# Patient Record
Sex: FEMALE | Race: OTHER | Employment: FULL TIME | URBAN - METROPOLITAN AREA
[De-identification: names, ages, dates, MRNs, and addresses within clinical notes are randomized per-mention and may not be internally consistent; named-entity substitution may affect disease eponyms.]

---

## 2018-03-06 PROBLEM — Z80.49 FAMILY HISTORY OF UTERINE CANCER: Status: ACTIVE | Noted: 2018-03-06

## 2019-06-10 PROBLEM — Z00.00 ROUTINE GENERAL MEDICAL EXAMINATION AT A HEALTH CARE FACILITY: Status: ACTIVE | Noted: 2019-06-10

## 2019-06-10 PROBLEM — Z79.899 ENCOUNTER FOR LONG-TERM (CURRENT) USE OF OTHER MEDICATIONS: Status: ACTIVE | Noted: 2019-06-10

## 2019-09-20 PROBLEM — Z00.00 ROUTINE GENERAL MEDICAL EXAMINATION AT A HEALTH CARE FACILITY: Status: RESOLVED | Noted: 2019-06-10 | Resolved: 2019-09-20

## 2020-07-20 ENCOUNTER — HOSPITAL ENCOUNTER (OUTPATIENT)
Dept: LAB | Age: 55
Discharge: HOME | End: 2020-07-20

## 2020-07-20 PROCEDURE — 88305 TISSUE EXAM BY PATHOLOGIST: CPT

## 2022-03-18 PROBLEM — Z79.899 ENCOUNTER FOR LONG-TERM (CURRENT) USE OF OTHER MEDICATIONS: Status: ACTIVE | Noted: 2019-06-10

## 2022-03-19 PROBLEM — Z80.49 FAMILY HISTORY OF UTERINE CANCER: Status: ACTIVE | Noted: 2018-03-06

## 2022-04-04 LAB — MAMMOGRAPHY, EXTERNAL: NORMAL

## 2022-08-11 ENCOUNTER — NURSE ONLY (OUTPATIENT)
Dept: FAMILY MEDICINE CLINIC | Facility: CLINIC | Age: 57
End: 2022-08-11
Payer: COMMERCIAL

## 2022-08-11 DIAGNOSIS — E55.9 VITAMIN D DEFICIENCY: ICD-10-CM

## 2022-08-11 DIAGNOSIS — Z00.00 BLOOD TESTS FOR ROUTINE GENERAL PHYSICAL EXAMINATION: Primary | ICD-10-CM

## 2022-08-11 DIAGNOSIS — E78.00 PURE HYPERCHOLESTEROLEMIA: ICD-10-CM

## 2022-08-11 LAB
25(OH)D3 SERPL-MCNC: 35.7 NG/ML (ref 30–100)
ALBUMIN SERPL-MCNC: 4.3 G/DL (ref 3.5–5)
ALBUMIN/GLOB SERPL: 1.1 {RATIO} (ref 1.2–3.5)
ALP SERPL-CCNC: 61 U/L (ref 50–136)
ALT SERPL-CCNC: 18 U/L (ref 12–65)
ANION GAP SERPL CALC-SCNC: 5 MMOL/L (ref 7–16)
AST SERPL-CCNC: 21 U/L (ref 15–37)
BACTERIA URINE, POC: ABNORMAL
BASOPHILS # BLD: 0 K/UL (ref 0–0.2)
BASOPHILS NFR BLD: 1 % (ref 0–2)
BILIRUB SERPL-MCNC: 0.5 MG/DL (ref 0.2–1.1)
BILIRUBIN, URINE, POC: NEGATIVE
BLOOD URINE, POC: NEGATIVE
BUN SERPL-MCNC: 12 MG/DL (ref 6–23)
CALCIUM SERPL-MCNC: 9.8 MG/DL (ref 8.3–10.4)
CASTS URINE, POC: ABNORMAL
CHLORIDE SERPL-SCNC: 105 MMOL/L (ref 98–107)
CHOLEST SERPL-MCNC: 203 MG/DL
CO2 SERPL-SCNC: 27 MMOL/L (ref 21–32)
CREAT SERPL-MCNC: 0.7 MG/DL (ref 0.6–1)
DIFFERENTIAL METHOD BLD: ABNORMAL
EOSINOPHIL # BLD: 0.2 K/UL (ref 0–0.8)
EOSINOPHIL NFR BLD: 3 % (ref 0.5–7.8)
EPI CELLS URINE, POC: ABNORMAL
ERYTHROCYTE [DISTWIDTH] IN BLOOD BY AUTOMATED COUNT: 12.5 % (ref 11.9–14.6)
GLOBULIN SER CALC-MCNC: 3.9 G/DL (ref 2.3–3.5)
GLUCOSE SERPL-MCNC: 94 MG/DL (ref 65–100)
GLUCOSE URINE, POC: NEGATIVE
HCT VFR BLD AUTO: 36.1 % (ref 35.8–46.3)
HDLC SERPL-MCNC: 57 MG/DL (ref 40–60)
HDLC SERPL: 3.6 {RATIO}
HGB BLD-MCNC: 11.2 G/DL (ref 11.7–15.4)
HIV 1+2 AB+HIV1 P24 AG SERPL QL IA: NONREACTIVE
HIV 1/2 RESULT COMMENT: NORMAL
IMM GRANULOCYTES # BLD AUTO: 0 K/UL (ref 0–0.5)
IMM GRANULOCYTES NFR BLD AUTO: 0 % (ref 0–5)
KETONES, URINE, POC: NEGATIVE
LDLC SERPL CALC-MCNC: 98.6 MG/DL
LEUKOCYTE ESTERASE, URINE, POC: ABNORMAL
LYMPHOCYTES # BLD: 3.5 K/UL (ref 0.5–4.6)
LYMPHOCYTES NFR BLD: 45 % (ref 13–44)
MCH RBC QN AUTO: 31.2 PG (ref 26.1–32.9)
MCHC RBC AUTO-ENTMCNC: 31 G/DL (ref 31.4–35)
MCV RBC AUTO: 100.6 FL (ref 79.6–97.8)
MONOCYTES # BLD: 0.5 K/UL (ref 0.1–1.3)
MONOCYTES NFR BLD: 7 % (ref 4–12)
NEUTS SEG # BLD: 3.3 K/UL (ref 1.7–8.2)
NEUTS SEG NFR BLD: 44 % (ref 43–78)
NITRITE, URINE, POC: NEGATIVE
NRBC # BLD: 0 K/UL (ref 0–0.2)
PH, URINE, POC: 7.5 (ref 4.6–8)
PLATELET # BLD AUTO: 300 K/UL (ref 150–450)
PMV BLD AUTO: 10.5 FL (ref 9.4–12.3)
POTASSIUM SERPL-SCNC: 4.1 MMOL/L (ref 3.5–5.1)
PROT SERPL-MCNC: 8.2 G/DL (ref 6.3–8.2)
PROTEIN,URINE, POC: NEGATIVE
RBC # BLD AUTO: 3.59 M/UL (ref 4.05–5.2)
RBC, URINE, POC: ABNORMAL
SODIUM SERPL-SCNC: 137 MMOL/L (ref 136–145)
SPECIFIC GRAVITY, URINE, POC: 1.02 (ref 1–1.03)
TRICHOMONAS URINE, POC: ABNORMAL
TRIGL SERPL-MCNC: 237 MG/DL (ref 35–150)
TSH, 3RD GENERATION: 0.9 UIU/ML (ref 0.36–3.74)
URINALYSIS CLARITY, POC: ABNORMAL
URINALYSIS COLOR, POC: YELLOW
UROBILINOGEN, POC: NORMAL
VLDLC SERPL CALC-MCNC: 47.4 MG/DL (ref 6–23)
WBC # BLD AUTO: 7.5 K/UL (ref 4.3–11.1)
WBC, URINE, POC: ABNORMAL
YEAST, URINE, POC: ABNORMAL

## 2022-08-11 PROCEDURE — 81000 URINALYSIS NONAUTO W/SCOPE: CPT | Performed by: FAMILY MEDICINE

## 2022-08-23 ENCOUNTER — OFFICE VISIT (OUTPATIENT)
Dept: FAMILY MEDICINE CLINIC | Facility: CLINIC | Age: 57
End: 2022-08-23
Payer: COMMERCIAL

## 2022-08-23 VITALS
BODY MASS INDEX: 22.68 KG/M2 | HEIGHT: 63 IN | WEIGHT: 128 LBS | DIASTOLIC BLOOD PRESSURE: 66 MMHG | SYSTOLIC BLOOD PRESSURE: 110 MMHG

## 2022-08-23 DIAGNOSIS — Z78.0 POSTMENOPAUSAL: ICD-10-CM

## 2022-08-23 DIAGNOSIS — Z00.00 ROUTINE GENERAL MEDICAL EXAMINATION AT A HEALTH CARE FACILITY: Primary | ICD-10-CM

## 2022-08-23 DIAGNOSIS — I10 ESSENTIAL (PRIMARY) HYPERTENSION: ICD-10-CM

## 2022-08-23 DIAGNOSIS — M54.2 NECK PAIN: ICD-10-CM

## 2022-08-23 DIAGNOSIS — Z13.820 SCREENING FOR OSTEOPOROSIS: ICD-10-CM

## 2022-08-23 DIAGNOSIS — E78.00 PURE HYPERCHOLESTEROLEMIA: ICD-10-CM

## 2022-08-23 DIAGNOSIS — L29.9 ITCH OF SKIN: ICD-10-CM

## 2022-08-23 PROCEDURE — 99396 PREV VISIT EST AGE 40-64: CPT | Performed by: FAMILY MEDICINE

## 2022-08-23 RX ORDER — CETIRIZINE HYDROCHLORIDE 10 MG/1
10 TABLET ORAL DAILY
Qty: 30 TABLET | Refills: 0 | Status: SHIPPED | OUTPATIENT
Start: 2022-08-23 | End: 2022-09-22

## 2022-08-23 RX ORDER — DICLOFENAC SODIUM 75 MG/1
75 TABLET, DELAYED RELEASE ORAL 2 TIMES DAILY PRN
Qty: 60 TABLET | Refills: 0 | Status: SHIPPED | OUTPATIENT
Start: 2022-08-23 | End: 2022-11-03

## 2022-08-23 RX ORDER — SIMVASTATIN 40 MG
80 TABLET ORAL NIGHTLY
Qty: 90 TABLET | Refills: 3 | Status: SHIPPED | OUTPATIENT
Start: 2022-08-23

## 2022-08-23 RX ORDER — FENOFIBRATE 160 MG/1
160 TABLET ORAL DAILY
Qty: 90 TABLET | Refills: 3 | Status: SHIPPED | OUTPATIENT
Start: 2022-08-23

## 2022-08-23 RX ORDER — LISINOPRIL 10 MG/1
10 TABLET ORAL DAILY
Qty: 90 TABLET | Refills: 3 | Status: SHIPPED | OUTPATIENT
Start: 2022-08-23

## 2022-08-23 ASSESSMENT — PATIENT HEALTH QUESTIONNAIRE - PHQ9
1. LITTLE INTEREST OR PLEASURE IN DOING THINGS: 0
SUM OF ALL RESPONSES TO PHQ QUESTIONS 1-9: 0
SUM OF ALL RESPONSES TO PHQ QUESTIONS 1-9: 0
SUM OF ALL RESPONSES TO PHQ9 QUESTIONS 1 & 2: 0
2. FEELING DOWN, DEPRESSED OR HOPELESS: 0
SUM OF ALL RESPONSES TO PHQ QUESTIONS 1-9: 0
SUM OF ALL RESPONSES TO PHQ QUESTIONS 1-9: 0

## 2022-08-23 ASSESSMENT — ENCOUNTER SYMPTOMS
COUGH: 0
ABDOMINAL PAIN: 0
SHORTNESS OF BREATH: 0

## 2022-08-23 NOTE — PROGRESS NOTES
PROGRESS NOTE    SUBJECTIVE:   Mena Fay is a 61 y.o. female seen for a follow up visit regarding the following chief complaint:     Chief Complaint   Patient presents with    Annual Exam    Discuss Labs    Back Pain     Upper back pain           HPI patient presents office today for complete physical complaining of a rash underneath her neck and neck pain denies any recent trauma      Past Medical History, Past Surgical History, Family history, Social History, and Medications were all reviewed with the patient today and updated as necessary. Current Outpatient Medications   Medication Sig Dispense Refill    fenofibrate (TRIGLIDE) 160 MG tablet Take 1 tablet by mouth daily 90 tablet 3    simvastatin (ZOCOR) 40 MG tablet Take 2 tablets by mouth nightly 90 tablet 3    lisinopril (PRINIVIL;ZESTRIL) 10 MG tablet Take 1 tablet by mouth daily 90 tablet 3    diclofenac (VOLTAREN) 75 MG EC tablet Take 1 tablet by mouth 2 times daily as needed for Pain 60 tablet 0    cetirizine (ZYRTEC) 10 MG tablet Take 1 tablet by mouth daily 30 tablet 0    venlafaxine (EFFEXOR XR) 75 MG extended release capsule 1 p.o. twice daily       No current facility-administered medications for this visit.      No Known Allergies  Patient Active Problem List   Diagnosis    Encounter for long-term (current) use of other medications    Hyperlipidemia    Onychomycosis    Family history of uterine cancer    Menopause    Back pain     Past Medical History:   Diagnosis Date    Back pain     Hyperlipidemia     Menopause     Onychomycosis 2015     Past Surgical History:   Procedure Laterality Date     SECTION      COLONOSCOPY  2015    @ [facility] w/ Dr. Paty Mitchell Spikes      herniated disc     Family History   Problem Relation Age of Onset    Uterine Cancer Sister 48        Decsd at age 46    Pancreatic Cancer Father     Asthma Mother      Social History     Tobacco Use    Smoking status: Former Packs/day: 0.25     Years: 10.00     Pack years: 2.50     Types: Cigarettes     Start date: 1980     Quit date: 1990     Years since quittin.9    Smokeless tobacco: Never   Substance Use Topics    Alcohol use: Yes     Alcohol/week: 0.0 standard drinks         Review of Systems   Constitutional:  Negative for chills and fever. Respiratory:  Negative for cough and shortness of breath. Cardiovascular:  Negative for chest pain. Gastrointestinal:  Negative for abdominal pain. Endocrine: Negative for cold intolerance and heat intolerance. Genitourinary:  Negative for difficulty urinating, frequency, hematuria, pelvic pain, vaginal bleeding, vaginal discharge and vaginal pain. Musculoskeletal:  Positive for neck pain. Skin:  Positive for rash. Neurological:  Negative for headaches. Psychiatric/Behavioral: Negative. OBJECTIVE:  /66 (Site: Left Upper Arm, Position: Sitting, Cuff Size: Small Adult)   Ht 5' 3\" (1.6 m)   Wt 128 lb (58.1 kg)   BMI 22.67 kg/m²      Physical Exam  Vitals and nursing note reviewed. Constitutional:       Appearance: Normal appearance. HENT:      Head: Normocephalic and atraumatic. Nose: Nose normal.      Mouth/Throat:      Mouth: Mucous membranes are moist.      Pharynx: No oropharyngeal exudate or posterior oropharyngeal erythema. Eyes:      Extraocular Movements: Extraocular movements intact. Conjunctiva/sclera: Conjunctivae normal.      Pupils: Pupils are equal, round, and reactive to light. Cardiovascular:      Rate and Rhythm: Normal rate and regular rhythm. Pulses: Normal pulses. Heart sounds: Normal heart sounds. Pulmonary:      Effort: Pulmonary effort is normal.      Breath sounds: Normal breath sounds. Abdominal:      General: Abdomen is flat. Bowel sounds are normal.      Palpations: Abdomen is soft. Genitourinary:     Comments: Deferred done by OB/GYN  Musculoskeletal:      Cervical back: Neck supple. Tenderness present. Decreased range of motion. Back:    Skin:     General: Skin is warm. Capillary Refill: Capillary refill takes less than 2 seconds. Neurological:      General: No focal deficit present. Mental Status: She is alert and oriented to person, place, and time. Psychiatric:         Mood and Affect: Mood normal.         Behavior: Behavior normal.         Thought Content: Thought content normal.         Judgment: Judgment normal.        Medical problems and test results were reviewed with the patient today.      Recent Results (from the past 672 hour(s))   AMB POC URINALYSIS DIP STICK MANUAL W/ MICRO [facility]    Collection Time: 08/11/22  9:40 AM   Result Value Ref Range    Color (UA POC) Yellow     Clarity (UA POC) Slightly Cloudy     Glucose, Urine, POC Negative Negative    Bilirubin, Urine, POC Negative Negative    Ketones, Urine, POC Negative Negative    Specific Gravity, Urine, POC 1.025 1.001 - 1.035    Blood (UA POC) Negative Negative    pH, Urine, POC 7.5 4.6 - 8.0    Protein, Urine, POC Negative Negative    Urobilinogen, POC Normal     Nitrite, Urine, POC Negative Negative    Leukocyte Esterase, Urine, POC 4+ Negative    RBC, Urine, POC 0-3     WBC, Urine, POC 20-30     Epi Cells Urine, POC Few     Bacteria Urine, POC Moderate Negative    Casts Urine, POC      Yeast, Urine, POC Small     Trichomonas Urine, POC     Vitamin D 25 Hydroxy    Collection Time: 08/11/22 10:11 AM   Result Value Ref Range    Vit D, 25-Hydroxy 35.7 30.0 - 100.0 ng/mL   TSH    Collection Time: 08/11/22 10:11 AM   Result Value Ref Range    TSH, 3RD GENERATION 0.895 0.358 - 3.740 uIU/mL   Comprehensive Metabolic Panel    Collection Time: 08/11/22 10:11 AM   Result Value Ref Range    Sodium 137 136 - 145 mmol/L    Potassium 4.1 3.5 - 5.1 mmol/L    Chloride 105 98 - 107 mmol/L    CO2 27 21 - 32 mmol/L    Anion Gap 5 (L) 7 - 16 mmol/L    Glucose 94 65 - 100 mg/dL    BUN 12 6 - 23 MG/DL    Creatinine 0.70 0.6 - 1.0 MG/DL    GFR African American >60 >60 ml/min/1.73m2    GFR Non- >60 >60 ml/min/1.73m2    Calcium 9.8 8.3 - 10.4 MG/DL    Total Bilirubin 0.5 0.2 - 1.1 MG/DL    ALT 18 12 - 65 U/L    AST 21 15 - 37 U/L    Alk Phosphatase 61 50 - 136 U/L    Total Protein 8.2 6.3 - 8.2 g/dL    Albumin 4.3 3.5 - 5.0 g/dL    Globulin 3.9 (H) 2.3 - 3.5 g/dL    Albumin/Globulin Ratio 1.1 (L) 1.2 - 3.5     HIV 1/2 Ag/Ab, 4TH Generation,W Rflx Confirm    Collection Time: 08/11/22 10:11 AM   Result Value Ref Range    HIV 1/2 Interp NONREACTIVE NONREACTIVE      HIV 1/2 Result Comment SEE NOTE     Lipid Panel    Collection Time: 08/11/22 10:11 AM   Result Value Ref Range    Cholesterol, Total 203 (H) <200 MG/DL    Triglycerides 237 (H) 35 - 150 MG/DL    HDL 57 40 - 60 MG/DL    LDL Calculated 98.6 <100 MG/DL    VLDL Cholesterol Calculated 47.4 (H) 6.0 - 23.0 MG/DL    Chol/HDL Ratio 3.6     CBC with Auto Differential    Collection Time: 08/11/22 10:11 AM   Result Value Ref Range    WBC 7.5 4.3 - 11.1 K/uL    RBC 3.59 (L) 4.05 - 5.2 M/uL    Hemoglobin 11.2 (L) 11.7 - 15.4 g/dL    Hematocrit 36.1 35.8 - 46.3 %    .6 (H) 79.6 - 97.8 FL    MCH 31.2 26.1 - 32.9 PG    MCHC 31.0 (L) 31.4 - 35.0 g/dL    RDW 12.5 11.9 - 14.6 %    Platelets 722 491 - 412 K/uL    MPV 10.5 9.4 - 12.3 FL    nRBC 0.00 0.0 - 0.2 K/uL    Differential Type AUTOMATED      Seg Neutrophils 44 43 - 78 %    Lymphocytes 45 (H) 13 - 44 %    Monocytes 7 4.0 - 12.0 %    Eosinophils % 3 0.5 - 7.8 %    Basophils 1 0.0 - 2.0 %    Immature Granulocytes 0 0.0 - 5.0 %    Segs Absolute 3.3 1.7 - 8.2 K/UL    Absolute Lymph # 3.5 0.5 - 4.6 K/UL    Absolute Mono # 0.5 0.1 - 1.3 K/UL    Absolute Eos # 0.2 0.0 - 0.8 K/UL    Basophils Absolute 0.0 0.0 - 0.2 K/UL    Absolute Immature Granulocyte 0.0 0.0 - 0.5 K/UL       ASSESSMENT and PLAN    Visit Diagnoses and Associated Orders       Routine general medical examination at a health care facility    -  [facility] POC URINALYSIS DIP STICK MANUAL W/O MICRO [83550 CPT(R)]   - Future Order    Hepatitis C Antibody [10243 Custom]   - Future Order    TSH [96031 Custom]   - Future Order    Vitamin D 25 Hydroxy [34748 Custom]   - Future Order    Lipid Panel [37081 Custom]   - Future Order    Comprehensive Metabolic Panel [78542 Custom]   - Future Order    AMB POC KTY COMPLETE CBC [77409 CPT(R)]   - Future Order    HIV 1/2 Ag/Ab, 4TH Generation,W Rflx Confirm [38213 CPT(R)]   - Future Order         Neck pain        diclofenac (VOLTAREN) 75 MG EC tablet [64784]           Pure hypercholesterolemia        fenofibrate (TRIGLIDE) 160 MG tablet [16655]      simvastatin (ZOCOR) 40 MG tablet [08360]           Essential (primary) hypertension        lisinopril (PRINIVIL;ZESTRIL) 10 MG tablet [66400]           Itch of skin        cetirizine (ZYRTEC) 10 MG tablet [9506]           Screening for osteoporosis        DEXA BONE DENSITY AXIAL SKELETON [42855 Custom]   - Future Order         Postmenopausal        DEXA BONE DENSITY AXIAL SKELETON [16704 Custom]   - Future Order                     Diagnosis Orders   1. Routine general medical examination at a health care facility  AMB POC URINALYSIS DIP STICK MANUAL W/O MICRO    Hepatitis C Antibody    TSH    Vitamin D 25 Hydroxy    Lipid Panel    Comprehensive Metabolic Panel    AMB POC KTY COMPLETE CBC    HIV 1/2 Ag/Ab, 4TH Generation,W Rflx Confirm      2. Neck pain  diclofenac (VOLTAREN) 75 MG EC tablet      3. Pure hypercholesterolemia  fenofibrate (TRIGLIDE) 160 MG tablet    simvastatin (ZOCOR) 40 MG tablet      4. Essential (primary) hypertension  lisinopril (PRINIVIL;ZESTRIL) 10 MG tablet      5. Itch of skin  cetirizine (ZYRTEC) 10 MG tablet      6. Screening for osteoporosis  DEXA BONE DENSITY AXIAL SKELETON      7. Postmenopausal  DEXA BONE DENSITY AXIAL SKELETON      , Ivonne Chung was seen today for annual exam, discuss labs and back pain.     Diagnoses and all orders for this visit:    Routine general medical examination at a health care facility  -     AMB POC URINALYSIS DIP STICK MANUAL W/O MICRO; Future  -     Hepatitis C Antibody; Future  -     TSH; Future  -     Vitamin D 25 Hydroxy; Future  -     Lipid Panel; Future  -     Comprehensive Metabolic Panel; Future  -     AMB POC KTY COMPLETE CBC; Future  -     HIV 1/2 Ag/Ab, 4TH Generation,W Rflx Confirm; Future    Neck pain  -     diclofenac (VOLTAREN) 75 MG EC tablet; Take 1 tablet by mouth 2 times daily as needed for Pain    Pure hypercholesterolemia  -     fenofibrate (TRIGLIDE) 160 MG tablet; Take 1 tablet by mouth daily  -     simvastatin (ZOCOR) 40 MG tablet; Take 2 tablets by mouth nightly    Essential (primary) hypertension  -     lisinopril (PRINIVIL;ZESTRIL) 10 MG tablet; Take 1 tablet by mouth daily    Itch of skin  -     cetirizine (ZYRTEC) 10 MG tablet; Take 1 tablet by mouth daily    Screening for osteoporosis  -     DEXA BONE DENSITY AXIAL SKELETON; Future    Postmenopausal  -     DEXA BONE DENSITY AXIAL SKELETON; Future    , Otherwise normal routine physical exam in terms of her rash and recommended cortisone No. 10 and Zyrtec for her itching for her neck pain recommended stretching exercises alternating ice and heat start her on anti-inflammatories supportive care but if signs symptoms persist or worsen will refer her to physical therapy. I have spent a total of 8-15 minutes assessing, reviewing, and discussing the depression screening with patient in office today.   Pap smear is and breast exam are ordered by Nidhi Jovel she is up-to-date with her colonoscopy

## 2022-11-02 NOTE — PROGRESS NOTES
HPI:  Ms. Vinod Lind is a 61 y.o.   OB History          1    Para   1    Term   1            AB        Living             SAB        IAB        Ectopic        Molar        Multiple        Live Births                 who is here today for a well woman exam. She complains of hot flashes for the last 5 years. Date Performed Result   PAP 2020 Negative, HPV negative    Mammogram 3/31/2022 negative   Colonoscopy 2015 WNL   Dexa na          No obstetric history on file. GYN History           No LMP recorded. Patient is postmenopausal.     Past Medical History:  Past Medical History:   Diagnosis Date    Back pain     Hyperlipidemia     Menopause     Onychomycosis 2015       Past Surgical History:  Past Surgical History:   Procedure Laterality Date     SECTION      COLONOSCOPY  2015    @ [facility] Endoscopy w/ Dr. Carlos Chu Sebastian [place]      herniated disc       Allergies:   No Known Allergies    Medication History:  Current Outpatient Medications   Medication Sig Dispense Refill    fenofibrate (TRIGLIDE) 160 MG tablet Take 1 tablet by mouth daily 90 tablet 3    simvastatin (ZOCOR) 40 MG tablet Take 2 tablets by mouth nightly 90 tablet 3    lisinopril (PRINIVIL;ZESTRIL) 10 MG tablet Take 1 tablet by mouth daily 90 tablet 3     No current facility-administered medications for this visit. Social History:  Social History     Socioeconomic History    Marital status:      Spouse name: Not on file    Number of children: Not on file    Years of education: Not on file    Highest education level: Not on file   Occupational History    Not on file   Tobacco Use    Smoking status: Former     Packs/day: 0.25     Years: 10.00     Pack years: 2.50     Types: Cigarettes     Start date: 1980     Quit date: 1990     Years since quittin.1    Smokeless tobacco: Never   Vaping Use    Vaping Use: Never used   Substance and Sexual Activity    Alcohol use:  Yes Alcohol/week: 0.0 standard drinks    Drug use: Never    Sexual activity: Not on file   Other Topics Concern    Not on file   Social History Narrative    Not on file     Social Determinants of Health     Financial Resource Strain: Not on file   Food Insecurity: Not on file   Transportation Needs: Not on file   Physical Activity: Not on file   Stress: Not on file   Social Connections: Not on file   Intimate Partner Violence: Not on file   Housing Stability: Not on file       Family History:  Family History   Problem Relation Age of Onset    Uterine Cancer Sister 48        Decsd at age 46    Pancreatic Cancer Father     Asthma Mother        Review of Systems - General ROS: negative except for that discussed in HPI      ROS:  Feeling well. No dyspnea or chest pain on exertion. No abdominal pain, change in bowel habits, black or bloody stools. No urinary tract symptoms. No neurological complaints. Objective:   BP (!) 108/50   Ht 5' 3\" (1.6 m)   Wt 130 lb 9.6 oz (59.2 kg)   BMI 23.13 kg/m²     Results for orders placed or performed in visit on 11/03/22   AMB POC URINALYSIS DIP STICK AUTO W/O MICRO   Result Value Ref Range    Color (UA POC) Yellow     Clarity (UA POC) Clear     Glucose, Urine, POC Negative Negative    Bilirubin, Urine, POC Negative Negative    KETONES, Urine, POC Negative Negative    Specific Gravity, Urine, POC 1.010 1.001 - 1.035    Blood (UA POC) Trace Negative    pH, Urine, POC 7.0 4.6 - 8.0    Protein, Urine, POC Negative Negative    Urobilinogen, POC 0.2 mg/dL     Nitrite, Urine, POC Negative Negative    Leukocyte Esterase, Urine, POC Trace Negative        The patient appears well, alert, oriented x 3, in no distress. ENT normal.  Neck supple. No adenopathy or thyromegaly. Lungs:  clear, good air entry, no wheezes, rhonchi or rales. Heart:  S1 and S2 normal, no murmurs, regular rate and rhythm. Abdomen:  soft without tenderness, guarding, mass or organomegaly.    Extremities show no edema, normal peripheral pulses. Neurological is normal, no focal findings. BREAST EXAM: breasts appear normal, no suspicious masses, no skin or nipple changes or axillary nodes, symmetric fibrous changes bilaterally    PELVIC EXAM: External genitalia is within normal limits, urethra, urethra meatus and bladder are midline well supported. Vagina is atrophic, Cervix comes into full view and is within normal limits. Uterus is 6, ante week size, no ovarian masses palpated    Assessment/Plan:      Diagnosis Orders   1. Well woman exam  PAP IG, HPV Rfx HPV 16/18,45    AMB POC URINALYSIS DIP STICK AUTO W/O MICRO      2. Screening for genitourinary condition  AMB POC URINALYSIS DIP STICK AUTO W/O MICRO      3. Screening for cervical cancer  PAP IG, HPV Rfx HPV 16/18,45      4. Screening for human papillomavirus (HPV)  PAP IG, HPV Rfx HPV 16/18,45        Encounter Diagnoses   Name Primary? Well woman exam Yes    Screening for genitourinary condition     Screening for cervical cancer     Screening for human papillomavirus (HPV)      Orders Placed This Encounter   Procedures    PAP IG, HPV Rfx HPV 16/18,45     Standing Status:   Future     Standing Expiration Date:   11/2/2023     Order Specific Question:   Pap Source? (Required)     Answer:   cervical     Order Specific Question:   Pap Source? (Required)     Answer:   endocervical     Order Specific Question:   Pap collection method? (Required     Answer:   broom     Order Specific Question:   Pap collection method? (Required     Answer:   brush     Order Specific Question:   Menstrual Status ? Answer:   Postmenopausal [2]     Order Specific Question:   Previous Treatment?           (Required)     Answer:   NONE    AMB POC URINALYSIS DIP STICK AUTO W/O MICRO       mammogram  pap smear  return annually or prn  Will start vagifem for dyspareunia and atrophic vaginitis    Christie Carey RN

## 2022-11-03 ENCOUNTER — OFFICE VISIT (OUTPATIENT)
Dept: OBGYN CLINIC | Age: 57
End: 2022-11-03
Payer: COMMERCIAL

## 2022-11-03 VITALS
DIASTOLIC BLOOD PRESSURE: 50 MMHG | SYSTOLIC BLOOD PRESSURE: 108 MMHG | WEIGHT: 130.6 LBS | HEIGHT: 63 IN | BODY MASS INDEX: 23.14 KG/M2

## 2022-11-03 DIAGNOSIS — Z12.4 SCREENING FOR CERVICAL CANCER: ICD-10-CM

## 2022-11-03 DIAGNOSIS — Z01.419 WELL WOMAN EXAM: Primary | ICD-10-CM

## 2022-11-03 DIAGNOSIS — Z11.51 SCREENING FOR HUMAN PAPILLOMAVIRUS (HPV): ICD-10-CM

## 2022-11-03 DIAGNOSIS — Z13.89 SCREENING FOR GENITOURINARY CONDITION: ICD-10-CM

## 2022-11-03 LAB
BILIRUBIN, URINE, POC: NEGATIVE
BLOOD URINE, POC: NORMAL
GLUCOSE URINE, POC: NEGATIVE
KETONES, URINE, POC: NEGATIVE
LEUKOCYTE ESTERASE, URINE, POC: NORMAL
NITRITE, URINE, POC: NEGATIVE
PH, URINE, POC: 7 (ref 4.6–8)
PROTEIN,URINE, POC: NEGATIVE
SPECIFIC GRAVITY, URINE, POC: 1.01 (ref 1–1.03)
URINALYSIS CLARITY, POC: CLEAR
URINALYSIS COLOR, POC: YELLOW
UROBILINOGEN, POC: NORMAL

## 2022-11-03 PROCEDURE — 99396 PREV VISIT EST AGE 40-64: CPT | Performed by: OBSTETRICS & GYNECOLOGY

## 2022-11-03 PROCEDURE — 81003 URINALYSIS AUTO W/O SCOPE: CPT | Performed by: OBSTETRICS & GYNECOLOGY

## 2022-11-03 RX ORDER — ESTRADIOL 10 UG/1
INSERT VAGINAL
Qty: 26 TABLET | Refills: 3 | Status: SHIPPED | OUTPATIENT
Start: 2022-11-03

## 2022-11-08 LAB
CYTOLOGIST CVX/VAG CYTO: NORMAL
CYTOLOGY CVX/VAG DOC THIN PREP: NORMAL
HPV APTIMA: NEGATIVE
Lab: NORMAL
PATH REPORT.FINAL DX SPEC: NORMAL
STAT OF ADQ CVX/VAG CYTO-IMP: NORMAL

## 2023-02-28 ENCOUNTER — OFFICE VISIT (OUTPATIENT)
Dept: FAMILY MEDICINE CLINIC | Facility: CLINIC | Age: 58
End: 2023-02-28
Payer: COMMERCIAL

## 2023-02-28 VITALS
WEIGHT: 122 LBS | SYSTOLIC BLOOD PRESSURE: 130 MMHG | DIASTOLIC BLOOD PRESSURE: 80 MMHG | BODY MASS INDEX: 21.62 KG/M2 | HEIGHT: 63 IN

## 2023-02-28 DIAGNOSIS — M54.2 NECK PAIN: Primary | ICD-10-CM

## 2023-02-28 PROCEDURE — 96372 THER/PROPH/DIAG INJ SC/IM: CPT | Performed by: FAMILY MEDICINE

## 2023-02-28 PROCEDURE — 99213 OFFICE O/P EST LOW 20 MIN: CPT | Performed by: FAMILY MEDICINE

## 2023-02-28 RX ORDER — NAPROXEN 500 MG/1
500 TABLET ORAL 2 TIMES DAILY PRN
Qty: 60 TABLET | Refills: 0 | Status: SHIPPED | OUTPATIENT
Start: 2023-02-28

## 2023-02-28 RX ORDER — CYCLOBENZAPRINE HCL 10 MG
10 TABLET ORAL NIGHTLY PRN
Qty: 30 TABLET | Refills: 0 | Status: SHIPPED | OUTPATIENT
Start: 2023-02-28

## 2023-02-28 RX ORDER — KETOROLAC TROMETHAMINE 30 MG/ML
30 INJECTION, SOLUTION INTRAMUSCULAR; INTRAVENOUS ONCE
Status: COMPLETED | OUTPATIENT
Start: 2023-02-28 | End: 2023-02-28

## 2023-02-28 RX ADMIN — KETOROLAC TROMETHAMINE 30 MG: 30 INJECTION, SOLUTION INTRAMUSCULAR; INTRAVENOUS at 10:49

## 2023-02-28 SDOH — ECONOMIC STABILITY: HOUSING INSECURITY
IN THE LAST 12 MONTHS, WAS THERE A TIME WHEN YOU DID NOT HAVE A STEADY PLACE TO SLEEP OR SLEPT IN A SHELTER (INCLUDING NOW)?: NO

## 2023-02-28 SDOH — ECONOMIC STABILITY: INCOME INSECURITY: HOW HARD IS IT FOR YOU TO PAY FOR THE VERY BASICS LIKE FOOD, HOUSING, MEDICAL CARE, AND HEATING?: NOT HARD AT ALL

## 2023-02-28 SDOH — ECONOMIC STABILITY: FOOD INSECURITY: WITHIN THE PAST 12 MONTHS, YOU WORRIED THAT YOUR FOOD WOULD RUN OUT BEFORE YOU GOT MONEY TO BUY MORE.: NEVER TRUE

## 2023-02-28 SDOH — ECONOMIC STABILITY: FOOD INSECURITY: WITHIN THE PAST 12 MONTHS, THE FOOD YOU BOUGHT JUST DIDN'T LAST AND YOU DIDN'T HAVE MONEY TO GET MORE.: NEVER TRUE

## 2023-02-28 ASSESSMENT — ENCOUNTER SYMPTOMS
NAUSEA: 0
SHORTNESS OF BREATH: 0
VOMITING: 0

## 2023-02-28 ASSESSMENT — PATIENT HEALTH QUESTIONNAIRE - PHQ9
1. LITTLE INTEREST OR PLEASURE IN DOING THINGS: 1
SUM OF ALL RESPONSES TO PHQ QUESTIONS 1-9: 2
SUM OF ALL RESPONSES TO PHQ9 QUESTIONS 1 & 2: 2
2. FEELING DOWN, DEPRESSED OR HOPELESS: 1

## 2023-02-28 NOTE — PROGRESS NOTES
PROGRESS NOTE    SUBJECTIVE:   Gabi London is a [id number] y.o. female seen for a follow up visit regarding the following chief complaint:     Chief Complaint   Patient presents with    Back Pain     Chronic upper back pain from neck down to lumbar spine, chiropractor for 3 months, neck and shoulder pain, wants a referral to spine especialist           HPI  Patient presents office complaining of neck pain thoracic pain has been seeing a chiropractor for a couple months now states that she is not better wants to go see her previous pain doctor who gave her shots in the neck    Past Medical History, Past Surgical History, Family history, Social History, and Medications were all reviewed with the patient today and updated as necessary. Current Outpatient Medications   Medication Sig Dispense Refill    cyclobenzaprine (FLEXERIL) 10 MG tablet Take 1 tablet by mouth nightly as needed for Muscle spasms 30 tablet 0    naproxen (NAPROSYN) 500 MG tablet Take 1 tablet by mouth 2 times daily as needed for Pain 60 tablet 0    fenofibrate (TRIGLIDE) 160 MG tablet Take 1 tablet by mouth daily 90 tablet 3    simvastatin (ZOCOR) 40 MG tablet Take 2 tablets by mouth nightly 90 tablet 3    lisinopril (PRINIVIL;ZESTRIL) 10 MG tablet Take 1 tablet by mouth daily 90 tablet 3     No current facility-administered medications for this visit.      No Known Allergies  Patient Active Problem List   Diagnosis    Encounter for long-term (current) use of other medications    Hyperlipidemia    Onychomycosis    Family history of uterine cancer    Menopause    Back pain     Past Medical History:   Diagnosis Date    Back pain     Hyperlipidemia     Menopause     Onychomycosis 2015     Past Surgical History:   Procedure Laterality Date     SECTION      COLONOSCOPY  2015    @ [facility] Endoscopy w/ Dr. Madison Donnelly      herniated disc     Family History   Problem Relation Age of Onset    Uterine Cancer Sister 48 Decsd at age 46    Pancreatic Cancer Father     Asthma Mother      Social History     Tobacco Use    Smoking status: Former     Packs/day: 0.25     Years: 10.00     Pack years: 2.50     Types: Cigarettes     Start date: 1980     Quit date: 1990     Years since quittin.4     Passive exposure: Past    Smokeless tobacco: Never   Substance Use Topics    Alcohol use: Yes     Alcohol/week: 0.0 standard drinks         Review of Systems   Constitutional:  Negative for fever. Respiratory:  Negative for shortness of breath. Cardiovascular:  Negative for chest pain. Gastrointestinal:  Negative for nausea and vomiting. Musculoskeletal:  Positive for neck pain. OBJECTIVE:  /80 (Site: Left Upper Arm, Position: Sitting, Cuff Size: Small Adult)   Ht 5' 3\" (1.6 m)   Wt 122 lb (55.3 kg)   BMI 21.61 kg/m²      Physical Exam  Vitals and nursing note reviewed. Constitutional:       Appearance: Normal appearance. Cardiovascular:      Rate and Rhythm: Normal rate. Heart sounds: Normal heart sounds. Pulmonary:      Breath sounds: Normal breath sounds. Musculoskeletal:      Cervical back: Muscular tenderness present. Decreased range of motion. Neurological:      General: No focal deficit present. Mental Status: She is alert and oriented to person, place, and time. Psychiatric:         Mood and Affect: Mood normal.         Behavior: Behavior normal.        Medical problems and test results were reviewed with the patient today. No results found for this or any previous visit (from the past 672 hour(s)). ASSESSMENT and PLAN    Visit Diagnoses and Associated Orders       Neck pain    -  Primary    ketorolac (TORADOL) injection 30 mg [41360]      cyclobenzaprine (FLEXERIL) 10 MG tablet [2017]      naproxen (NAPROSYN) 500 MG tablet [5393]      External Referral To Pain Medicine [RQQ121 Custom]                       Diagnosis Orders   1.  Neck pain  ketorolac (TORADOL) injection 30 mg    cyclobenzaprine (FLEXERIL) 10 MG tablet    naproxen (NAPROSYN) 500 MG tablet    External Referral To Pain Medicine      , Ld Judge was seen today for back pain. Diagnoses and all orders for this visit:    Neck pain  -     ketorolac (TORADOL) injection 30 mg  -     cyclobenzaprine (FLEXERIL) 10 MG tablet; Take 1 tablet by mouth nightly as needed for Muscle spasms  -     naproxen (NAPROSYN) 500 MG tablet;  Take 1 tablet by mouth 2 times daily as needed for Pain  -     External Referral To Pain Medicine    , We gave her a Toradol shot in the office we will start her on Naprosyn 500 mg 1 p.o. twice daily ice and stretching exercises gone over start on Flexeril at bedtime we will get her to pain management for further evaluation

## 2023-04-05 LAB — MAMMOGRAPHY, EXTERNAL: NORMAL

## 2023-08-01 DIAGNOSIS — E78.00 PURE HYPERCHOLESTEROLEMIA: ICD-10-CM

## 2023-08-01 DIAGNOSIS — I10 ESSENTIAL (PRIMARY) HYPERTENSION: ICD-10-CM

## 2023-08-01 RX ORDER — LISINOPRIL 10 MG/1
10 TABLET ORAL DAILY
Qty: 90 TABLET | Refills: 0 | Status: SHIPPED | OUTPATIENT
Start: 2023-08-01

## 2023-08-01 RX ORDER — SIMVASTATIN 40 MG
80 TABLET ORAL NIGHTLY
Qty: 90 TABLET | Refills: 0 | Status: SHIPPED | OUTPATIENT
Start: 2023-08-01

## 2023-09-06 ENCOUNTER — NURSE ONLY (OUTPATIENT)
Dept: FAMILY MEDICINE CLINIC | Facility: CLINIC | Age: 58
End: 2023-09-06
Payer: COMMERCIAL

## 2023-09-06 DIAGNOSIS — Z00.00 ROUTINE GENERAL MEDICAL EXAMINATION AT A HEALTH CARE FACILITY: ICD-10-CM

## 2023-09-06 LAB
25(OH)D3 SERPL-MCNC: 28.5 NG/ML (ref 30–100)
ALBUMIN SERPL-MCNC: 4 G/DL (ref 3.5–5)
ALBUMIN/GLOB SERPL: 1.1 (ref 0.4–1.6)
ALP SERPL-CCNC: 69 U/L (ref 50–136)
ALT SERPL-CCNC: 29 U/L (ref 12–65)
ANION GAP SERPL CALC-SCNC: 8 MMOL/L (ref 2–11)
AST SERPL-CCNC: 29 U/L (ref 15–37)
BASOPHILS # BLD: 0.1 K/UL (ref 0–0.2)
BASOPHILS NFR BLD: 1 % (ref 0–2)
BILIRUB SERPL-MCNC: 0.5 MG/DL (ref 0.2–1.1)
BILIRUBIN, URINE, POC: NEGATIVE
BLOOD URINE, POC: NEGATIVE
BUN SERPL-MCNC: 9 MG/DL (ref 6–23)
CALCIUM SERPL-MCNC: 9.7 MG/DL (ref 8.3–10.4)
CHLORIDE SERPL-SCNC: 106 MMOL/L (ref 101–110)
CHOLEST SERPL-MCNC: 199 MG/DL
CO2 SERPL-SCNC: 26 MMOL/L (ref 21–32)
CREAT SERPL-MCNC: 0.8 MG/DL (ref 0.6–1)
DIFFERENTIAL METHOD BLD: ABNORMAL
EOSINOPHIL # BLD: 0.1 K/UL (ref 0–0.8)
EOSINOPHIL NFR BLD: 2 % (ref 0.5–7.8)
ERYTHROCYTE [DISTWIDTH] IN BLOOD BY AUTOMATED COUNT: 12.9 % (ref 11.9–14.6)
GLOBULIN SER CALC-MCNC: 3.6 G/DL (ref 2.8–4.5)
GLUCOSE SERPL-MCNC: 93 MG/DL (ref 65–100)
GLUCOSE URINE, POC: NEGATIVE
HCT VFR BLD AUTO: 38 % (ref 35.8–46.3)
HCV AB SER QL: NONREACTIVE
HDLC SERPL-MCNC: 48 MG/DL (ref 40–60)
HDLC SERPL: 4.1
HGB BLD-MCNC: 12.2 G/DL (ref 11.7–15.4)
HIV 1+2 AB+HIV1 P24 AG SERPL QL IA: NONREACTIVE
HIV 1/2 RESULT COMMENT: NORMAL
IMM GRANULOCYTES # BLD AUTO: 0 K/UL (ref 0–0.5)
IMM GRANULOCYTES NFR BLD AUTO: 0 % (ref 0–5)
KETONES, URINE, POC: NEGATIVE
LDLC SERPL CALC-MCNC: 72 MG/DL
LEUKOCYTE ESTERASE, URINE, POC: NEGATIVE
LYMPHOCYTES # BLD: 3.2 K/UL (ref 0.5–4.6)
LYMPHOCYTES NFR BLD: 37 % (ref 13–44)
MCH RBC QN AUTO: 31.1 PG (ref 26.1–32.9)
MCHC RBC AUTO-ENTMCNC: 32.1 G/DL (ref 31.4–35)
MCV RBC AUTO: 96.9 FL (ref 82–102)
MONOCYTES # BLD: 0.5 K/UL (ref 0.1–1.3)
MONOCYTES NFR BLD: 6 % (ref 4–12)
NEUTS SEG # BLD: 4.8 K/UL (ref 1.7–8.2)
NEUTS SEG NFR BLD: 54 % (ref 43–78)
NITRITE, URINE, POC: NEGATIVE
NRBC # BLD: 0.02 K/UL (ref 0–0.2)
PH, URINE, POC: 7 (ref 4.6–8)
PLATELET # BLD AUTO: 418 K/UL (ref 150–450)
PMV BLD AUTO: 10.6 FL (ref 9.4–12.3)
POTASSIUM SERPL-SCNC: 4.1 MMOL/L (ref 3.5–5.1)
PROT SERPL-MCNC: 7.6 G/DL (ref 6.3–8.2)
PROTEIN,URINE, POC: NEGATIVE
RBC # BLD AUTO: 3.92 M/UL (ref 4.05–5.2)
SODIUM SERPL-SCNC: 140 MMOL/L (ref 133–143)
SPECIFIC GRAVITY, URINE, POC: 1.01 (ref 1–1.03)
TRIGL SERPL-MCNC: 395 MG/DL (ref 35–150)
TSH, 3RD GENERATION: 1.47 UIU/ML (ref 0.36–3.74)
URINALYSIS CLARITY, POC: CLEAR
URINALYSIS COLOR, POC: YELLOW
UROBILINOGEN, POC: NORMAL
VLDLC SERPL CALC-MCNC: 79 MG/DL (ref 6–23)
WBC # BLD AUTO: 8.7 K/UL (ref 4.3–11.1)

## 2023-09-06 PROCEDURE — 81002 URINALYSIS NONAUTO W/O SCOPE: CPT | Performed by: FAMILY MEDICINE

## 2023-09-14 ENCOUNTER — OFFICE VISIT (OUTPATIENT)
Dept: FAMILY MEDICINE CLINIC | Facility: CLINIC | Age: 58
End: 2023-09-14
Payer: COMMERCIAL

## 2023-09-14 VITALS
DIASTOLIC BLOOD PRESSURE: 80 MMHG | OXYGEN SATURATION: 98 % | SYSTOLIC BLOOD PRESSURE: 120 MMHG | WEIGHT: 131 LBS | BODY MASS INDEX: 23.21 KG/M2 | HEIGHT: 63 IN | HEART RATE: 86 BPM

## 2023-09-14 DIAGNOSIS — I10 ESSENTIAL (PRIMARY) HYPERTENSION: ICD-10-CM

## 2023-09-14 DIAGNOSIS — E55.9 VITAMIN D DEFICIENCY: ICD-10-CM

## 2023-09-14 DIAGNOSIS — Z13.31 SCREENING FOR DEPRESSION: ICD-10-CM

## 2023-09-14 DIAGNOSIS — Z00.00 ROUTINE GENERAL MEDICAL EXAMINATION AT A HEALTH CARE FACILITY: Primary | ICD-10-CM

## 2023-09-14 DIAGNOSIS — E78.00 PURE HYPERCHOLESTEROLEMIA: ICD-10-CM

## 2023-09-14 PROCEDURE — 3079F DIAST BP 80-89 MM HG: CPT | Performed by: FAMILY MEDICINE

## 2023-09-14 PROCEDURE — 3074F SYST BP LT 130 MM HG: CPT | Performed by: FAMILY MEDICINE

## 2023-09-14 PROCEDURE — 99396 PREV VISIT EST AGE 40-64: CPT | Performed by: FAMILY MEDICINE

## 2023-09-14 RX ORDER — SIMVASTATIN 40 MG
80 TABLET ORAL NIGHTLY
Qty: 90 TABLET | Refills: 3 | Status: SHIPPED | OUTPATIENT
Start: 2023-09-14

## 2023-09-14 RX ORDER — LISINOPRIL 10 MG/1
10 TABLET ORAL DAILY
Qty: 90 TABLET | Refills: 3 | Status: SHIPPED | OUTPATIENT
Start: 2023-09-14

## 2023-09-14 RX ORDER — FENOFIBRATE 160 MG/1
160 TABLET ORAL DAILY
Qty: 90 TABLET | Refills: 3 | Status: SHIPPED | OUTPATIENT
Start: 2023-09-14

## 2023-09-14 ASSESSMENT — ENCOUNTER SYMPTOMS
COUGH: 0
ABDOMINAL PAIN: 0
SHORTNESS OF BREATH: 0

## 2023-09-14 NOTE — PROGRESS NOTES
PROGRESS NOTE    SUBJECTIVE:   Sanjay Galvez is a 61 y.o. female seen for a follow up visit regarding the following chief complaint:         HPI patient presents office today for complete physical without complaints Paps and pelvics are done by her OB/GYN and she is up-to-date going through menopause so we will hold off on doing any type of bone density for now no family history      Past Medical History, Past Surgical History, Family history, Social History, and Medications were all reviewed with the patient today and updated as necessary. Current Outpatient Medications   Medication Sig Dispense Refill    lisinopril (PRINIVIL;ZESTRIL) 10 MG tablet Take 1 tablet by mouth daily 90 tablet 3    simvastatin (ZOCOR) 40 MG tablet Take 2 tablets by mouth nightly 90 tablet 3    fenofibrate (TRIGLIDE) 160 MG tablet Take 1 tablet by mouth daily 90 tablet 3     No current facility-administered medications for this visit.      No Known Allergies  Patient Active Problem List   Diagnosis    Encounter for long-term (current) use of other medications    Hyperlipidemia    Onychomycosis    Family history of uterine cancer    Menopause    Back pain     Past Medical History:   Diagnosis Date    Back pain     Hyperlipidemia     Menopause     Onychomycosis 2015     Past Surgical History:   Procedure Laterality Date     SECTION      COLONOSCOPY  2015    @ [facility] w/ Dr. Maria Elena [place]      herniated disc     Family History   Problem Relation Age of Onset    Uterine Cancer Sister 48        Decsd at age 46    Pancreatic Cancer Father     Asthma Mother      Social History     Tobacco Use    Smoking status: Former     Packs/day: 0.25     Years: 10.00     Additional pack years: 0.00     Total pack years: 2.50     Types: Cigarettes     Start date: 1980     Quit date: 1990     Years since quittin.9     Passive exposure: Past    Smokeless tobacco: Never   Substance Use Topics

## 2023-11-30 ENCOUNTER — COMMUNITY OUTREACH (OUTPATIENT)
Dept: FAMILY MEDICINE CLINIC | Facility: CLINIC | Age: 58
End: 2023-11-30

## 2023-11-30 NOTE — PROGRESS NOTES
Care Everywhere audit shows patient had a mammogram done 4/3/2023 and 3/31/2022. [facility] has been updated.

## 2024-02-20 NOTE — PROGRESS NOTES
HPI:  Ms. Huizar is a 58 y.o.   OB History          1    Para   1    Term   1            AB        Living             SAB        IAB        Ectopic        Molar        Multiple        Live Births                 who is here today for a well woman exam. She complains of ***     Date Performed Result   PAP 11/3/22 Neg, HPV Neg   Mammogram 23 There are benign calcifications in both breasts.  There also are benign vascular calcifications in both breasts.     No significant masses, calcifications, or other findings are seen in either breast.      Colonoscopy 2015 WN   Dexa NA                  GYN History           No LMP recorded. Patient is postmenopausal. {Pos/neg trace:85979} postcoital bleeding    Past Medical History:  Past Medical History:   Diagnosis Date    Back pain     Hyperlipidemia     Menopause     Onychomycosis 2015       Past Surgical History:  Past Surgical History:   Procedure Laterality Date     SECTION      COLONOSCOPY  2015    @ [facility] Endoscopy w/ Dr. Jacob- [facility]    ORTHOPEDIC SURGERY      herniated disc       Allergies:   No Known Allergies    Medication History:  Current Outpatient Medications   Medication Sig Dispense Refill    lisinopril (PRINIVIL;ZESTRIL) 10 MG tablet Take 1 tablet by mouth daily 90 tablet 3    simvastatin (ZOCOR) 40 MG tablet Take 2 tablets by mouth nightly 90 tablet 3    fenofibrate (TRIGLIDE) 160 MG tablet Take 1 tablet by mouth daily 90 tablet 3     No current facility-administered medications for this visit.       Social History:  Social History     Socioeconomic History    Marital status:      Spouse name: Not on file    Number of children: Not on file    Years of education: Not on file    Highest education level: Not on file   Occupational History    Not on file   Tobacco Use    Smoking status: Former     Current packs/day: 0.00     Average packs/day: 0.3 packs/day for 10.0 years (2.5 ttl pk-yrs)     Types: Cigarettes

## 2024-02-21 ENCOUNTER — OFFICE VISIT (OUTPATIENT)
Dept: OBGYN CLINIC | Age: 59
End: 2024-02-21
Payer: COMMERCIAL

## 2024-02-21 VITALS
WEIGHT: 132.2 LBS | HEIGHT: 63 IN | DIASTOLIC BLOOD PRESSURE: 84 MMHG | SYSTOLIC BLOOD PRESSURE: 128 MMHG | BODY MASS INDEX: 23.42 KG/M2

## 2024-02-21 DIAGNOSIS — Z01.419 WELL WOMAN EXAM: Primary | ICD-10-CM

## 2024-02-21 DIAGNOSIS — Z12.31 SCREENING MAMMOGRAM FOR BREAST CANCER: ICD-10-CM

## 2024-02-21 DIAGNOSIS — Z13.89 SCREENING FOR GENITOURINARY CONDITION: ICD-10-CM

## 2024-02-21 DIAGNOSIS — Z12.4 SCREENING FOR CERVICAL CANCER: ICD-10-CM

## 2024-02-21 DIAGNOSIS — Z11.51 SCREENING FOR HUMAN PAPILLOMAVIRUS (HPV): ICD-10-CM

## 2024-02-21 LAB
BILIRUBIN, URINE, POC: NEGATIVE
BLOOD URINE, POC: NEGATIVE
GLUCOSE URINE, POC: NEGATIVE
KETONES, URINE, POC: NEGATIVE
LEUKOCYTE ESTERASE, URINE, POC: NORMAL
NITRITE, URINE, POC: NEGATIVE
PH, URINE, POC: 7 (ref 4.6–8)
PROTEIN,URINE, POC: NEGATIVE
SPECIFIC GRAVITY, URINE, POC: 1.02 (ref 1–1.03)
URINALYSIS CLARITY, POC: CLEAR
URINALYSIS COLOR, POC: YELLOW
UROBILINOGEN, POC: NORMAL

## 2024-02-21 PROCEDURE — 99396 PREV VISIT EST AGE 40-64: CPT | Performed by: OBSTETRICS & GYNECOLOGY

## 2024-02-21 PROCEDURE — 81002 URINALYSIS NONAUTO W/O SCOPE: CPT | Performed by: OBSTETRICS & GYNECOLOGY

## 2024-02-21 RX ORDER — ESTRADIOL 10 UG/1
INSERT VAGINAL
Qty: 18 TABLET | Refills: 4 | Status: SHIPPED | OUTPATIENT
Start: 2024-02-21

## 2024-02-21 NOTE — PROGRESS NOTES
HPI:  Ms. Huizar is a 58 y.o.   OB History          1    Para   1    Term   1            AB        Living             SAB        IAB        Ectopic        Molar        Multiple        Live Births                 who is here today for a well woman exam. She complains of pain in upper back. Pt states the pain has been going on for years. Pt did have a surgery in  for a herniated disc but the pain has become a little severe since then. Will address with Dr Espinoza  Not taking vagifem  No severe pms  Alexis gave her muscle relaxers for her pain- mri showed disc herniation  Needs another mri last one was 7 years ago will discuss with him   Date Performed Result   PAP 11/3/22 Neg, HPV Neg   Mammogram 23 There are benign calcifications in both breasts.  There also are benign vascular calcifications in both breasts.     No significant masses, calcifications, or other findings are seen in either breast.      Colonoscopy 2015 [facility]   Dexa NA                  GYN History           No LMP recorded. Patient is postmenopausal.   negative postcoital bleeding    Past Medical History:  Past Medical History:   Diagnosis Date    Back pain     Hyperlipidemia     Menopause     Onychomycosis 2015       Past Surgical History:  Past Surgical History:   Procedure Laterality Date     SECTION      COLONOSCOPY  2015    @ [facility] w/ Dr. Jacob- [facility]    ORTHOPEDIC SURGERY      herniated disc       Allergies:   No Known Allergies    Medication History:  Current Outpatient Medications   Medication Sig Dispense Refill    lisinopril (PRINIVIL;ZESTRIL) 10 MG tablet Take 1 tablet by mouth daily 90 tablet 3    simvastatin (ZOCOR) 40 MG tablet Take 2 tablets by mouth nightly 90 tablet 3    fenofibrate (TRIGLIDE) 160 MG tablet Take 1 tablet by mouth daily 90 tablet 3     No current facility-administered medications for this visit.       Social History:  Social History     Socioeconomic History

## 2024-03-01 LAB
COLLECTION METHOD: NORMAL
CYTOLOGIST CVX/VAG CYTO: NORMAL
CYTOLOGY CVX/VAG DOC THIN PREP: NORMAL
HPV APTIMA: NEGATIVE
Lab: NORMAL
PAP SOURCE: NORMAL
PATH REPORT.FINAL DX SPEC: NORMAL
STAT OF ADQ CVX/VAG CYTO-IMP: NORMAL

## 2024-04-24 ENCOUNTER — TELEPHONE (OUTPATIENT)
Dept: OBGYN CLINIC | Age: 59
End: 2024-04-24

## 2024-04-24 DIAGNOSIS — N64.89 BREAST ASYMMETRY: ICD-10-CM

## 2024-04-24 DIAGNOSIS — R92.8 ABNORMAL MAMMOGRAM: Primary | ICD-10-CM

## 2024-04-24 NOTE — TELEPHONE ENCOUNTER
Received order request from [facility] for Left Diag mammo and L breast US for abnormal mammo and Breast asymmetry. Orders placed and faxed to [facility].

## 2024-09-17 DIAGNOSIS — I10 ESSENTIAL (PRIMARY) HYPERTENSION: ICD-10-CM

## 2024-09-17 RX ORDER — LISINOPRIL 10 MG/1
10 TABLET ORAL DAILY
Qty: 90 TABLET | Refills: 0 | Status: SHIPPED | OUTPATIENT
Start: 2024-09-17

## 2024-09-18 ENCOUNTER — LAB (OUTPATIENT)
Dept: FAMILY MEDICINE CLINIC | Facility: CLINIC | Age: 59
End: 2024-09-18
Payer: COMMERCIAL

## 2024-09-18 DIAGNOSIS — E55.9 VITAMIN D DEFICIENCY: ICD-10-CM

## 2024-09-18 DIAGNOSIS — Z00.00 LABORATORY EXAM ORDERED AS PART OF ROUTINE GENERAL MEDICAL EXAMINATION: Primary | ICD-10-CM

## 2024-09-18 LAB
25(OH)D3 SERPL-MCNC: 41 NG/ML (ref 30–100)
ALBUMIN SERPL-MCNC: 4.2 G/DL (ref 3.5–5)
ALBUMIN/GLOB SERPL: 1.3 (ref 1–1.9)
ALP SERPL-CCNC: 68 U/L (ref 35–104)
ALT SERPL-CCNC: 18 U/L (ref 12–65)
ANION GAP SERPL CALC-SCNC: 10 MMOL/L (ref 9–18)
AST SERPL-CCNC: 28 U/L (ref 15–37)
BILIRUB SERPL-MCNC: 0.7 MG/DL (ref 0–1.2)
BILIRUBIN, URINE, POC: NEGATIVE
BLOOD URINE, POC: NEGATIVE
BUN SERPL-MCNC: 13 MG/DL (ref 6–23)
CALCIUM SERPL-MCNC: 9.9 MG/DL (ref 8.8–10.2)
CHLORIDE SERPL-SCNC: 102 MMOL/L (ref 98–107)
CHOLEST SERPL-MCNC: 219 MG/DL (ref 0–200)
CO2 SERPL-SCNC: 27 MMOL/L (ref 20–28)
CREAT SERPL-MCNC: 0.71 MG/DL (ref 0.6–1.1)
GLOBULIN SER CALC-MCNC: 3.2 G/DL (ref 2.3–3.5)
GLUCOSE SERPL-MCNC: 97 MG/DL (ref 70–99)
GLUCOSE URINE, POC: NEGATIVE
HDLC SERPL-MCNC: 55 MG/DL (ref 40–60)
HDLC SERPL: 4 (ref 0–5)
KETONES, URINE, POC: NEGATIVE
LDLC SERPL CALC-MCNC: 115 MG/DL (ref 0–100)
LEUKOCYTE ESTERASE, URINE, POC: ABNORMAL
NITRITE, URINE, POC: NEGATIVE
PH, URINE, POC: 7 (ref 4.6–8)
POTASSIUM SERPL-SCNC: 4.2 MMOL/L (ref 3.5–5.1)
PROT SERPL-MCNC: 7.4 G/DL (ref 6.3–8.2)
PROTEIN,URINE, POC: NEGATIVE
SODIUM SERPL-SCNC: 138 MMOL/L (ref 136–145)
SPECIFIC GRAVITY, URINE, POC: 1.01 (ref 1–1.03)
TRIGL SERPL-MCNC: 249 MG/DL (ref 0–150)
TSH, 3RD GENERATION: 1.14 UIU/ML (ref 0.27–4.2)
URINALYSIS CLARITY, POC: ABNORMAL
URINALYSIS COLOR, POC: YELLOW
UROBILINOGEN, POC: NORMAL
VLDLC SERPL CALC-MCNC: 50 MG/DL (ref 6–23)

## 2024-09-18 PROCEDURE — 81003 URINALYSIS AUTO W/O SCOPE: CPT | Performed by: FAMILY MEDICINE

## 2024-09-19 ENCOUNTER — LAB (OUTPATIENT)
Dept: FAMILY MEDICINE CLINIC | Facility: CLINIC | Age: 59
End: 2024-09-19
Payer: COMMERCIAL

## 2024-09-19 DIAGNOSIS — Z00.00 LABORATORY EXAM ORDERED AS PART OF ROUTINE GENERAL MEDICAL EXAMINATION: Primary | ICD-10-CM

## 2024-09-19 LAB
GRANS ABSOLUTE, POC: 5.2 K/UL
GRANULOCYTES %, POC: 53.5 %
HEMATOCRIT, POC: ABNORMAL %
HEMOGLOBIN, POC: ABNORMAL G/DL
LYMPHOCYTE %, POC: 39.3 %
LYMPHS ABSOLUTE, POC: 3.8 K/UL
MCH, POC: ABNORMAL PG (ref 40–?)
MCHC, POC: 32.7
MCV, POC: ABNORMAL
MONOCYTE %, POC: 7.2 %
MONOCYTE, ABSOLUTE POC: 0.7 K/UL
MPV, POC: 7.5 FL
PLATELET COUNT, POC: ABNORMAL K/UL
RBC, POC: ABNORMAL M/UL
RDW, POC: 11.5 %
WBC, POC: 9.7 K/UL

## 2024-09-19 PROCEDURE — 85025 COMPLETE CBC W/AUTO DIFF WBC: CPT | Performed by: FAMILY MEDICINE

## 2024-09-24 ENCOUNTER — TELEPHONE (OUTPATIENT)
Dept: FAMILY MEDICINE CLINIC | Facility: CLINIC | Age: 59
End: 2024-09-24

## 2024-09-26 ENCOUNTER — TELEPHONE (OUTPATIENT)
Dept: FAMILY MEDICINE CLINIC | Facility: CLINIC | Age: 59
End: 2024-09-26

## 2024-09-30 ENCOUNTER — OFFICE VISIT (OUTPATIENT)
Dept: FAMILY MEDICINE CLINIC | Facility: CLINIC | Age: 59
End: 2024-09-30
Payer: COMMERCIAL

## 2024-09-30 VITALS
WEIGHT: 131 LBS | BODY MASS INDEX: 23.21 KG/M2 | HEIGHT: 63 IN | DIASTOLIC BLOOD PRESSURE: 70 MMHG | OXYGEN SATURATION: 98 % | SYSTOLIC BLOOD PRESSURE: 100 MMHG

## 2024-09-30 DIAGNOSIS — I10 ESSENTIAL (PRIMARY) HYPERTENSION: ICD-10-CM

## 2024-09-30 DIAGNOSIS — Z00.00 ENCOUNTER FOR WELL ADULT EXAM WITHOUT ABNORMAL FINDINGS: Primary | ICD-10-CM

## 2024-09-30 DIAGNOSIS — Z13.31 SCREENING FOR DEPRESSION: ICD-10-CM

## 2024-09-30 DIAGNOSIS — E78.00 PURE HYPERCHOLESTEROLEMIA: ICD-10-CM

## 2024-09-30 PROCEDURE — 3074F SYST BP LT 130 MM HG: CPT | Performed by: FAMILY MEDICINE

## 2024-09-30 PROCEDURE — 99396 PREV VISIT EST AGE 40-64: CPT | Performed by: FAMILY MEDICINE

## 2024-09-30 PROCEDURE — 3078F DIAST BP <80 MM HG: CPT | Performed by: FAMILY MEDICINE

## 2024-09-30 RX ORDER — SIMVASTATIN 40 MG
80 TABLET ORAL NIGHTLY
Qty: 180 TABLET | Refills: 3 | Status: SHIPPED | OUTPATIENT
Start: 2024-09-30

## 2024-09-30 RX ORDER — LISINOPRIL 10 MG/1
10 TABLET ORAL DAILY
Qty: 90 TABLET | Refills: 3 | Status: SHIPPED | OUTPATIENT
Start: 2024-09-30

## 2024-09-30 RX ORDER — FENOFIBRATE 160 MG/1
160 TABLET ORAL DAILY
Qty: 90 TABLET | Refills: 3 | Status: SHIPPED | OUTPATIENT
Start: 2024-09-30

## 2024-09-30 SDOH — ECONOMIC STABILITY: FOOD INSECURITY: WITHIN THE PAST 12 MONTHS, THE FOOD YOU BOUGHT JUST DIDN'T LAST AND YOU DIDN'T HAVE MONEY TO GET MORE.: SOMETIMES TRUE

## 2024-09-30 SDOH — ECONOMIC STABILITY: FOOD INSECURITY: WITHIN THE PAST 12 MONTHS, YOU WORRIED THAT YOUR FOOD WOULD RUN OUT BEFORE YOU GOT MONEY TO BUY MORE.: SOMETIMES TRUE

## 2024-09-30 ASSESSMENT — PATIENT HEALTH QUESTIONNAIRE - PHQ9
SUM OF ALL RESPONSES TO PHQ QUESTIONS 1-9: 1
SUM OF ALL RESPONSES TO PHQ QUESTIONS 1-9: 1
2. FEELING DOWN, DEPRESSED OR HOPELESS: SEVERAL DAYS
1. LITTLE INTEREST OR PLEASURE IN DOING THINGS: NOT AT ALL
SUM OF ALL RESPONSES TO PHQ QUESTIONS 1-9: 1
SUM OF ALL RESPONSES TO PHQ QUESTIONS 1-9: 1
SUM OF ALL RESPONSES TO PHQ9 QUESTIONS 1 & 2: 1

## 2024-09-30 ASSESSMENT — ENCOUNTER SYMPTOMS
COUGH: 0
ABDOMINAL PAIN: 0
SHORTNESS OF BREATH: 0

## 2024-09-30 NOTE — PATIENT INSTRUCTIONS
doméstica.    [email or web address]  Lo que ofrecen: kayla amplia rylee de servicios a residentes de ingresos bajos y moderados en el estado Replaced by [facility].  Contacto: [phone]; [address] [place], [place] [address] [place] [address]  Lo que ofrecen: necesidades básicas de servicios de estabilidad y apoyo.  Contacto: [phone]; [address] [place] [address].    [facility]  Lo que ofrecen: ayuda a las familias e individuos que necesitan pagar alquiler y facturas de servicios públicos, comprar ropa y alimentos.  Contacto: [phone]; [address] [place] [address]    Asistencia con los medicamentos:    Good Rx  Lo que ofrecen: cupones para descuentos en medicamentos.  Sitio web: [email or web address] [email or web address]    [employer]      Lo que ofrecen: información gratuita sobre medicamentos y programas de ahorros en costos de atención de la donna, incluidos programas de asistencia con recetas, cupones y programas de descuento.  Contacto: [phone]; [email or web address].needTrackaPhone.org/    RX Assist  Lo que ofrecen: información sobre programas de medicamentos gratuitos y de bajo costo.  Sitio web: https://www.rxassist.org/    Programa de recetas de $4 de [employer]  Lo que ofrecen: el Programa de recetas incluye un suministro de hasta  30 días por $4 y un suministro de hasta 90 días por $10 en algunos fármacos genéricos cubiertos en las dosis frecuentemente recetadas.  Sitio web: https://www.[employer]/cp/4-prescriptions/97356    [employer]  Lo que ofrecen: si no tiene seguro y no puede permitirse el medicamento con receta que necesita, es posible que pueda comprar horacio recetas sin costo alguno a través de [employer]. Debe vivir en [place] [facility].  Información útil: Para averiguar si reúne los requisitos, visite www.Zeusta.org    [facility]  Lo que ofrecen: versiones de bajo costo de medicamentos genéricos de alto costo.  Sitio web: https://costExeo Entertainments.com/.     
all other ROS negative except as per HPI

## 2024-09-30 NOTE — PROGRESS NOTES
rechecking her cholesterol next few months and call back with the results and plan.I have spent a total of 8-15 minutes assessing, reviewing, and discussing the depression screening with patient in office today.

## 2025-03-06 ENCOUNTER — LAB (OUTPATIENT)
Dept: FAMILY MEDICINE CLINIC | Facility: CLINIC | Age: 60
End: 2025-03-06
Payer: COMMERCIAL

## 2025-03-06 DIAGNOSIS — E78.00 PURE HYPERCHOLESTEROLEMIA: ICD-10-CM

## 2025-03-06 LAB
CHOLEST SERPL-MCNC: 148 MG/DL (ref 0–200)
HDLC SERPL-MCNC: 51 MG/DL (ref 40–60)
HDLC SERPL: 2.9 (ref 0–5)
LDLC SERPL CALC-MCNC: 77 MG/DL (ref 0–100)
TRIGL SERPL-MCNC: 97 MG/DL (ref 0–150)
VLDLC SERPL CALC-MCNC: 19 MG/DL (ref 6–23)

## 2025-03-06 PROCEDURE — 36415 COLL VENOUS BLD VENIPUNCTURE: CPT | Performed by: FAMILY MEDICINE

## 2025-03-12 NOTE — PROGRESS NOTES
Pay for Housing in the Last Year: No     Number of Times Moved in the Last Year: 0     Homeless in the Last Year: No       Family History:  Family History   Problem Relation Age of Onset    Uterine Cancer Sister 50        Cancer de Utero    Pancreatic Cancer Father     Asthma Mother        Review of Systems - General ROS: negative except for that discussed in HPI      ROS:  Feeling well. No dyspnea or chest pain on exertion.  No abdominal pain, change in bowel habits, black or bloody stools.  No urinary tract symptoms. No neurological complaints.    Objective:   /78   Ht 1.6 m (5' 3\")   Wt 59 kg (130 lb 1.6 oz)   BMI 23.05 kg/m²     Results for orders placed or performed in visit on 03/14/25   AMB POC URINALYSIS DIP STICK MANUAL W/O MICRO   Result Value Ref Range    Color (UA POC) Yellow     Clarity (UA POC) Clear     Glucose, Urine, POC Negative     Bilirubin, Urine, POC Negative     Ketones, Urine, POC Negative     Specific Gravity, Urine, POC 1.015 1.001 - 1.035    Blood (UA POC) Small     pH, Urine, POC 6.0 4.6 - 8.0    Protein, Urine, POC Negative     Urobilinogen, POC 0.2 mg/dL <1.1 mg/dL    Nitrite, Urine, POC Negative Negative    Leukocyte Esterase, Urine, POC Trace         The patient appears well, alert, oriented x 3, in no distress.  ENT normal.  Neck supple. No adenopathy or thyromegaly.   Lungs:  clear, good air entry, no wheezes, rhonchi or rales.   Heart:  S1 and S2 normal, no murmurs, regular rate and rhythm.  Abdomen:  soft without tenderness, guarding, mass or organomegaly.   Extremities show no edema, normal peripheral pulses.   Neurological is normal, no focal findings.    BREAST EXAM: breasts appear normal, no suspicious masses, no skin or nipple changes or axillary nodes    PELVIC EXAM: External genitalia is within normal limits, urethra, urethra meatus and bladder are midline well supported. Vagina is well rugated, Cervix comes into full view and is within normal limits. Uterus is 6,

## 2025-03-14 ENCOUNTER — OFFICE VISIT (OUTPATIENT)
Dept: OBGYN CLINIC | Age: 60
End: 2025-03-14

## 2025-03-14 VITALS
BODY MASS INDEX: 23.05 KG/M2 | SYSTOLIC BLOOD PRESSURE: 128 MMHG | HEIGHT: 63 IN | WEIGHT: 130.1 LBS | DIASTOLIC BLOOD PRESSURE: 78 MMHG

## 2025-03-14 DIAGNOSIS — Z01.419 WELL WOMAN EXAM: Primary | ICD-10-CM

## 2025-03-14 DIAGNOSIS — Z12.31 SCREENING MAMMOGRAM FOR BREAST CANCER: ICD-10-CM

## 2025-03-14 DIAGNOSIS — Z13.89 SCREENING FOR GENITOURINARY CONDITION: ICD-10-CM

## 2025-03-14 DIAGNOSIS — R21 RASH: ICD-10-CM

## 2025-03-14 DIAGNOSIS — Z12.11 ENCOUNTER FOR SCREENING COLONOSCOPY: ICD-10-CM

## 2025-03-14 LAB
BILIRUBIN, URINE, POC: NEGATIVE
BLOOD URINE, POC: NORMAL
GLUCOSE URINE, POC: NEGATIVE
KETONES, URINE, POC: NEGATIVE
LEUKOCYTE ESTERASE, URINE, POC: NORMAL
NITRITE, URINE, POC: NEGATIVE
PH, URINE, POC: 6 (ref 4.6–8)
PROTEIN,URINE, POC: NEGATIVE
SPECIFIC GRAVITY, URINE, POC: 1.01 (ref 1–1.03)
URINALYSIS CLARITY, POC: CLEAR
URINALYSIS COLOR, POC: YELLOW
UROBILINOGEN, POC: NORMAL MG/DL

## 2025-03-18 ENCOUNTER — OFFICE VISIT (OUTPATIENT)
Dept: FAMILY MEDICINE CLINIC | Facility: CLINIC | Age: 60
End: 2025-03-18
Payer: COMMERCIAL

## 2025-03-18 VITALS
BODY MASS INDEX: 22.86 KG/M2 | DIASTOLIC BLOOD PRESSURE: 58 MMHG | HEART RATE: 99 BPM | SYSTOLIC BLOOD PRESSURE: 118 MMHG | WEIGHT: 129 LBS | HEIGHT: 63 IN

## 2025-03-18 DIAGNOSIS — J30.89 ENVIRONMENTAL AND SEASONAL ALLERGIES: ICD-10-CM

## 2025-03-18 DIAGNOSIS — F41.8 ANXIETY WITH DEPRESSION: ICD-10-CM

## 2025-03-18 DIAGNOSIS — L23.9 ALLERGIC CONTACT DERMATITIS, UNSPECIFIED TRIGGER: ICD-10-CM

## 2025-03-18 DIAGNOSIS — E78.00 PURE HYPERCHOLESTEROLEMIA: ICD-10-CM

## 2025-03-18 DIAGNOSIS — I10 ESSENTIAL (PRIMARY) HYPERTENSION: ICD-10-CM

## 2025-03-18 DIAGNOSIS — Z00.00 LABORATORY EXAM ORDERED AS PART OF ROUTINE GENERAL MEDICAL EXAMINATION: ICD-10-CM

## 2025-03-18 DIAGNOSIS — Z12.31 OTHER SCREENING MAMMOGRAM: Primary | ICD-10-CM

## 2025-03-18 PROCEDURE — 3074F SYST BP LT 130 MM HG: CPT | Performed by: FAMILY MEDICINE

## 2025-03-18 PROCEDURE — 3078F DIAST BP <80 MM HG: CPT | Performed by: FAMILY MEDICINE

## 2025-03-18 PROCEDURE — 99215 OFFICE O/P EST HI 40 MIN: CPT | Performed by: FAMILY MEDICINE

## 2025-03-18 PROCEDURE — 96372 THER/PROPH/DIAG INJ SC/IM: CPT | Performed by: FAMILY MEDICINE

## 2025-03-18 RX ORDER — TRIAMCINOLONE ACETONIDE 40 MG/ML
40 INJECTION, SUSPENSION INTRA-ARTICULAR; INTRAMUSCULAR ONCE
Status: COMPLETED | OUTPATIENT
Start: 2025-03-18 | End: 2025-03-18

## 2025-03-18 RX ORDER — CETIRIZINE HYDROCHLORIDE 10 MG/1
10 TABLET ORAL DAILY
Qty: 30 TABLET | Refills: 11 | Status: SHIPPED | OUTPATIENT
Start: 2025-03-18

## 2025-03-18 RX ORDER — SIMVASTATIN 40 MG
80 TABLET ORAL NIGHTLY
Qty: 180 TABLET | Refills: 3 | Status: SHIPPED | OUTPATIENT
Start: 2025-03-18

## 2025-03-18 RX ORDER — MOMETASONE FUROATE 1 MG/G
CREAM TOPICAL
Qty: 60 G | Refills: 0 | Status: SHIPPED | OUTPATIENT
Start: 2025-03-18

## 2025-03-18 RX ORDER — FENOFIBRATE 160 MG/1
160 TABLET ORAL DAILY
Qty: 90 TABLET | Refills: 3 | Status: SHIPPED | OUTPATIENT
Start: 2025-03-18

## 2025-03-18 RX ORDER — ESCITALOPRAM OXALATE 10 MG/1
10 TABLET ORAL DAILY
Qty: 90 TABLET | Refills: 3 | Status: SHIPPED | OUTPATIENT
Start: 2025-03-18

## 2025-03-18 RX ORDER — LISINOPRIL 10 MG/1
10 TABLET ORAL DAILY
Qty: 90 TABLET | Refills: 3 | Status: SHIPPED | OUTPATIENT
Start: 2025-03-18

## 2025-03-18 RX ADMIN — TRIAMCINOLONE ACETONIDE 40 MG: 40 INJECTION, SUSPENSION INTRA-ARTICULAR; INTRAMUSCULAR at 14:41

## 2025-03-18 ASSESSMENT — ENCOUNTER SYMPTOMS
COUGH: 0
SHORTNESS OF BREATH: 0
ABDOMINAL PAIN: 0
RHINORRHEA: 0
DIARRHEA: 0
SINUS PAIN: 0

## 2025-03-18 NOTE — PROGRESS NOTES
PROGRESS NOTE    SUBJECTIVE:   Laura Huizar is a 59 y.o. female seen for a follow up visit regarding the following chief complaint:     Chief Complaint   Patient presents with    Follow-up     labs           HPI patient presents office today for follow-up of labs as well as a list of multiple other complaints      Past Medical History, Past Surgical History, Family history, Social History, and Medications were all reviewed with the patient today and updated as necessary.       Current Outpatient Medications   Medication Sig Dispense Refill    fenofibrate 160 MG tablet Take 1 tablet by mouth daily 90 tablet 3    lisinopril (PRINIVIL;ZESTRIL) 10 MG tablet Take 1 tablet by mouth daily 90 tablet 3    simvastatin (ZOCOR) 40 MG tablet Take 2 tablets by mouth nightly 180 tablet 3    cetirizine (ZYRTEC) 10 MG tablet Take 1 tablet by mouth daily 30 tablet 11    escitalopram (LEXAPRO) 10 MG tablet Take 1 tablet by mouth daily 90 tablet 3    mometasone (ELOCON) 0.1 % cream Apply to affected area 2 times a day 60 g 0     No current facility-administered medications for this visit.     No Known Allergies  Patient Active Problem List   Diagnosis    Encounter for long-term (current) use of other medications    Hyperlipidemia    Onychomycosis    Family history of uterine cancer    Menopause    Back pain     Past Medical History:   Diagnosis Date    Back pain     Hyperlipidemia     Menopause     Onychomycosis 2015     Past Surgical History:   Procedure Laterality Date     SECTION      COLONOSCOPY  2015    @ [facility]/ Dr. Jacob- [facility]    ORTHOPEDIC SURGERY      herniated disc     Family History   Problem Relation Age of Onset    Uterine Cancer Sister 50        Cancer de Utero    Pancreatic Cancer Father     Asthma Mother      Social History     Tobacco Use    Smoking status: Former     Current packs/day: 0.00     Average packs/day: 0.3 packs/day for 11.7 years (3.0 ttl pk-yrs)     Types: Cigarettes